# Patient Record
Sex: MALE | Race: WHITE
[De-identification: names, ages, dates, MRNs, and addresses within clinical notes are randomized per-mention and may not be internally consistent; named-entity substitution may affect disease eponyms.]

---

## 2024-08-20 PROBLEM — Z00.00 ENCOUNTER FOR PREVENTIVE HEALTH EXAMINATION: Status: ACTIVE | Noted: 2024-08-20

## 2024-08-21 ENCOUNTER — NON-APPOINTMENT (OUTPATIENT)
Age: 34
End: 2024-08-21

## 2024-08-22 ENCOUNTER — APPOINTMENT (OUTPATIENT)
Dept: SURGERY | Facility: CLINIC | Age: 34
End: 2024-08-22
Payer: COMMERCIAL

## 2024-08-22 VITALS
BODY MASS INDEX: 33.93 KG/M2 | DIASTOLIC BLOOD PRESSURE: 78 MMHG | HEART RATE: 69 BPM | WEIGHT: 256 LBS | SYSTOLIC BLOOD PRESSURE: 121 MMHG | HEIGHT: 73 IN

## 2024-08-22 DIAGNOSIS — E78.00 PURE HYPERCHOLESTEROLEMIA, UNSPECIFIED: ICD-10-CM

## 2024-08-22 DIAGNOSIS — Z78.9 OTHER SPECIFIED HEALTH STATUS: ICD-10-CM

## 2024-08-22 DIAGNOSIS — K64.5 PERIANAL VENOUS THROMBOSIS: ICD-10-CM

## 2024-08-22 DIAGNOSIS — F90.9 ATTENTION-DEFICIT HYPERACTIVITY DISORDER, UNSPECIFIED TYPE: ICD-10-CM

## 2024-08-22 DIAGNOSIS — K64.9 UNSPECIFIED HEMORRHOIDS: ICD-10-CM

## 2024-08-22 PROCEDURE — 99203 OFFICE O/P NEW LOW 30 MIN: CPT

## 2024-08-22 RX ORDER — ROSUVASTATIN CALCIUM 5 MG/1
5 TABLET, FILM COATED ORAL
Refills: 0 | Status: ACTIVE | COMMUNITY

## 2024-08-22 NOTE — PLAN
[FreeTextEntry1] : findings were discussed w the patient  all of the options risks and benefits explained, including surgical excision vs conservative management   pt will continue to apply topical cream to affected area  he will call back /follow up if he wishes to proceed with surgical excision   Patient's questions and concerns addressed to their satisfaction, and patient verbalized an understanding of the information discussed.

## 2024-08-22 NOTE — PHYSICAL EXAM
[Normal] : was normal [None] : there was no rectal mass  [No Rash or Lesion] : No rash or lesion [Alert] : alert [Oriented to Person] : oriented to person [Oriented to Place] : oriented to place [Oriented to Time] : oriented to time [Calm] : calm [de-identified] : A/Ox3; NAD. appears comfortable [de-identified] : EOMI; sclera anicteric. [de-identified] : airway patent, no use of accessory muscles [de-identified] : no cervical lymphadenopathy  [de-identified] : abd is soft, NT/ND [de-identified] : thrombosed external hemorrhoid

## 2024-08-22 NOTE — PHYSICAL EXAM
[Normal] : was normal [None] : there was no rectal mass  [No Rash or Lesion] : No rash or lesion [Alert] : alert [Oriented to Person] : oriented to person [Oriented to Place] : oriented to place [Oriented to Time] : oriented to time [Calm] : calm [de-identified] : A/Ox3; NAD. appears comfortable [de-identified] : EOMI; sclera anicteric. [de-identified] : airway patent, no use of accessory muscles [de-identified] : no cervical lymphadenopathy  [de-identified] : abd is soft, NT/ND [de-identified] : thrombosed external hemorrhoid

## 2024-08-22 NOTE — CONSULT LETTER
[Dear  ___] : Dear  [unfilled], [Consult Letter:] : I had the pleasure of evaluating your patient, [unfilled]. [Consult Closing:] : Thank you very much for allowing me to participate in the care of this patient.  If you have any questions, please do not hesitate to contact me. [Sincerely,] : Sincerely, [FreeTextEntry3] : Pola Elizondo MD General Surgery

## 2024-08-22 NOTE — HISTORY OF PRESENT ILLNESS
[de-identified] : Mr. EMMANUEL is a 33 year y/o M with PMH HLD, ADHD- on Rosuvastatin, presents to the office for consultation visit w cc of feeling a burning sensation /lump to the rectal area. Pt states he's felt the lump for a few weeks. He states the burning sensation is very bothersome. Denies feeling pain. No bleeding. BM's are normal. He is presently using topical PREPH ointment which he notes does provide symptomatic relief over the past week.    PCP: Dr. Danette Randall

## 2024-08-22 NOTE — HISTORY OF PRESENT ILLNESS
[de-identified] : Mr. EMMANUEL is a 33 year y/o M with PMH HLD, ADHD- on Rosuvastatin, presents to the office for consultation visit w cc of feeling a burning sensation /lump to the rectal area. Pt states he's felt the lump for a few weeks. He states the burning sensation is very bothersome. Denies feeling pain. No bleeding. BM's are normal. He is presently using topical PREPH ointment which he notes does provide symptomatic relief over the past week.    PCP: Dr. Danette Randall

## 2024-08-22 NOTE — REVIEW OF SYSTEMS
[Negative] : Heme/Lymph [Abdominal Pain] : no abdominal pain [Constipation] : no constipation [Diarrhea] : no diarrhea [FreeTextEntry8] : c/o burning sensation and lump to rectal area